# Patient Record
Sex: MALE | Race: WHITE
[De-identification: names, ages, dates, MRNs, and addresses within clinical notes are randomized per-mention and may not be internally consistent; named-entity substitution may affect disease eponyms.]

---

## 2019-11-25 ENCOUNTER — HOSPITAL ENCOUNTER (EMERGENCY)
Dept: HOSPITAL 97 - ER | Age: 69
Discharge: HOME | End: 2019-11-25
Payer: COMMERCIAL

## 2019-11-25 VITALS — OXYGEN SATURATION: 97 % | SYSTOLIC BLOOD PRESSURE: 145 MMHG | DIASTOLIC BLOOD PRESSURE: 90 MMHG

## 2019-11-25 VITALS — TEMPERATURE: 99.2 F

## 2019-11-25 DIAGNOSIS — K80.20: Primary | ICD-10-CM

## 2019-11-25 LAB
ALBUMIN SERPL BCP-MCNC: 3.9 G/DL (ref 3.4–5)
ALP SERPL-CCNC: 66 U/L (ref 45–117)
ALT SERPL W P-5'-P-CCNC: 29 U/L (ref 12–78)
AST SERPL W P-5'-P-CCNC: 16 U/L (ref 15–37)
BUN BLD-MCNC: 12 MG/DL (ref 7–18)
GLUCOSE SERPLBLD-MCNC: 118 MG/DL (ref 74–106)
HCT VFR BLD CALC: 44.7 % (ref 39.6–49)
INR BLD: 0.96
LIPASE SERPL-CCNC: 84 U/L (ref 73–393)
LYMPHOCYTES # SPEC AUTO: 0.8 K/UL (ref 0.7–4.9)
MAGNESIUM SERPL-MCNC: 2.1 MG/DL (ref 1.8–2.4)
NT-PROBNP SERPL-MCNC: 244 PG/ML (ref ?–125)
PMV BLD: 7.9 FL (ref 7.6–11.3)
POTASSIUM SERPL-SCNC: 4.1 MMOL/L (ref 3.5–5.1)
RBC # BLD: 4.88 M/UL (ref 4.33–5.43)
TROPONIN (EMERG DEPT USE ONLY): < 0.02 NG/ML (ref 0–0.04)

## 2019-11-25 PROCEDURE — 96361 HYDRATE IV INFUSION ADD-ON: CPT

## 2019-11-25 PROCEDURE — 96375 TX/PRO/DX INJ NEW DRUG ADDON: CPT

## 2019-11-25 PROCEDURE — 80076 HEPATIC FUNCTION PANEL: CPT

## 2019-11-25 PROCEDURE — 85610 PROTHROMBIN TIME: CPT

## 2019-11-25 PROCEDURE — 36415 COLL VENOUS BLD VENIPUNCTURE: CPT

## 2019-11-25 PROCEDURE — 96365 THER/PROPH/DIAG IV INF INIT: CPT

## 2019-11-25 PROCEDURE — 83690 ASSAY OF LIPASE: CPT

## 2019-11-25 PROCEDURE — 83735 ASSAY OF MAGNESIUM: CPT

## 2019-11-25 PROCEDURE — 71045 X-RAY EXAM CHEST 1 VIEW: CPT

## 2019-11-25 PROCEDURE — 85025 COMPLETE CBC W/AUTO DIFF WBC: CPT

## 2019-11-25 PROCEDURE — 83880 ASSAY OF NATRIURETIC PEPTIDE: CPT

## 2019-11-25 PROCEDURE — 96366 THER/PROPH/DIAG IV INF ADDON: CPT

## 2019-11-25 PROCEDURE — 84484 ASSAY OF TROPONIN QUANT: CPT

## 2019-11-25 PROCEDURE — 76705 ECHO EXAM OF ABDOMEN: CPT

## 2019-11-25 PROCEDURE — 80048 BASIC METABOLIC PNL TOTAL CA: CPT

## 2019-11-25 PROCEDURE — 93005 ELECTROCARDIOGRAM TRACING: CPT

## 2019-11-25 PROCEDURE — 99285 EMERGENCY DEPT VISIT HI MDM: CPT

## 2019-11-25 NOTE — RAD REPORT
EXAM DESCRIPTION:  RAD - Chest Single View - 11/25/2019 5:19 pm

 

CLINICAL HISTORY:  ABDOMINAL DISTENTION

Chest pain.

 

COMPARISON:  No comparisons

 

FINDINGS:  Portable technique limits examination quality.

 

The lungs are grossly clear. The heart is normal in size. No displaced fractures.

 

IMPRESSION:  No acute intrathoracic process suspected.

## 2019-11-25 NOTE — ER
Nurse's Notes                                                                                     

 [facility]                                                                 

Name: Maxwell Barnes                                                                                

Age: 69 yrs                                                                                       

Sex: Male                                                                                         

: 1950                                                                                   

MRN: [id number]                                                                                   

Arrival Date: 2019                                                                          

Time: 16:23                                                                                       

Account#: [id number]                                                                            

Bed 24                                                                                            

Private MD:                                                                                       

Diagnosis: Abdominal tenderness;Cholelithiasis                                                    

                                                                                                  

Presentation:                                                                                     

                                                                                             

16:28 Presenting complaint: Patient states: abd pain since this morning, nausea. Transition   la1 

      of care: patient was not received from another setting of care. Onset of symptoms was       

      2019. Risk Assessment: Do you want to hurt yourself or someone else?           

      Patient reports no desire to harm self or others. Initial Sepsis Screen: Does the           

      patient meet any 2 criteria? HR > 90 bpm. Does the patient have a suspected source of       

      infection? No. Patient's initial sepsis screen is negative. Care prior to arrival: None.    

16:28 Method Of Arrival: Ambulatory                                                           la1 

16:28 Acuity: ARON 3                                                                           la1 

                                                                                                  

Historical:                                                                                       

- Allergies:                                                                                      

16:29 NKDA;                                                                                   la1 

- PMHx:                                                                                           

16:29 None;                                                                                   la1 

                                                                                                  

- Immunization history:: Adult Immunizations up to date.                                          

- Social history:: Smoking status: Patient/guardian denies using tobacco.                         

- Family history:: not pertinent.                                                                 

- Ebola Screening: : No symptoms or risks identified at this time.                                

                                                                                                  

                                                                                                  

Screenin:45 Abuse screen: Denies threats or abuse. Denies injuries from another. Nutritional        aj1 

      screening: No deficits noted. Tuberculosis screening: No symptoms or risk factors           

      identified. Fall Risk None identified.                                                      

                                                                                                  

Assessment:                                                                                       

16:45 General: Appears in no apparent distress. comfortable, Behavior is calm, cooperative,   aj1 

      appropriate for age. Pain: Complains of pain in right upper quadrant and epigastric         

      area Pain does not radiate. Neuro: Level of Consciousness is awake, alert, obeys            

      commands, Oriented to person, place, time, situation. Cardiovascular: Patient's skin is     

      warm and dry. Respiratory: Airway is patent Respiratory effort is even, unlabored,          

      Respiratory pattern is regular, symmetrical. GI: Abdomen is distended, Bowel sounds         

      present X 4 quads. Abdomen is tender to palpation in right upper quadrant and               

      epigastric area Reports upper abdominal pain, bloating, nausea, Patient currently           

      denies diarrhea, vomiting. : No signs and/or symptoms were reported regarding the         

      genitourinary system. EENT: No signs and/or symptoms were reported regarding the EENT       

      system. Derm: Skin is pink, warm \T\ dry. Rash noted that is red, on face.                  

      Musculoskeletal: No signs and/or symptoms reported regarding the musculoskeletal            

      system. Circulation, motion, and sensation intact.                                          

17:36 Reassessment: Patient appears in no apparent distress at this time. No changes from     aj1 

      previously documented assessment. Patient and/or family updated on plan of care and         

      expected duration. Pain level reassessed. Patient is alert, oriented x 3, equal             

      unlabored respirations, skin warm/dry/pink.                                                 

18:30 Reassessment: Patient appears in no apparent distress at this time. No changes from     aj1 

      previously documented assessment. Patient and/or family updated on plan of care and         

      expected duration. Pain level reassessed. Patient is alert, oriented x 3, equal             

      unlabored respirations, skin warm/dry/pink.                                                 

19:30 Reassessment: Patient states that he does not want to be admitted over night. He would  aj1 

      like to go home and follow up with Dr. Trujillo. Notified Dr. Trujillo of patient             

      request, who will go see the patient.                                                       

19:45 Reassessment: Dr. Trujillo at bedside to evaluate patient.                               aj1 

20:03 Reassessment: Patient is okay to be discharged home, patient will report to day surgery aj1 

      at 0900 tomorrow for laproscopic cholecystectomy. House supervisor LESLIE Mitchell, notified       

      and states that he will place patient on surgery schedule. Patient is concerned about       

      getting a ride home after surgery tomorrow. Patient states that he can call himself a       

      cab, Notified house supervisor, LESLIE Mitchell who spoke with OR Director to approve patient      

      calling himself a cab after surgery.                                                        

20:05 Reassessment: Patient to finish Zosyn infusion prior to discharge per Dr. Trujillo.      aj1 

21:03 Reassessment: Patient appears in no apparent distress at this time. No changes from     aj1 

      previously documented assessment. Patient and/or family updated on plan of care and         

      expected duration. Pain level reassessed. Patient is alert, oriented x 3, equal             

      unlabored respirations, skin warm/dry/pink.                                                 

                                                                                                  

Vital Signs:                                                                                      

16:29  / 97; Pulse 115; Resp 16; Temp 99.2; Pulse Ox 100% on R/A; Weight 99.79 kg;      la1 

      Height 5 ft. 11 in. (180.34 cm);                                                            

17:37  / 81; Pulse 93; Resp 18; Pulse Ox 95% on R/A;                                    aj1 

18:30  / 94; Pulse 93; Resp 18; Pulse Ox 98% on R/A;                                    aj1 

19:30  / 87; Pulse 91; Resp 18; Pulse Ox 100% on R/A;                                   aj1 

20:30  / 90; Pulse 90; Resp 18; Pulse Ox 97% on R/A;                                    aj1 

16:29 Body Mass Index 30.68 (99.79 kg, 180.34 cm)                                             la1 

                                                                                                  

ED Course:                                                                                        

16:23 Patient arrived in ED.                                                                  as  

16:29 Triage completed.                                                                       la1 

16:29 Arm band placed on left wrist.                                                          la1 

16:30 Vince Staples MD is Attending Physician.                                             roderick 

16:37 Justina Lemos, RN is Primary Nurse.                                                   aj1 

16:45 No provider procedures requiring assistance completed.                                  aj1 

17:15 Initial lab(s) drawn, by me, sent to lab. Inserted saline lock: 20 gauge in right       jp3 

      wrist, using aseptic technique. Patient maintains SpO2 saturation greater than 95% on       

      room air.                                                                                   

17:16 Bed in low position. Call light in reach. Side rails up X 1. Side rails up X2. Warm     jp3 

      blanket given. Verbal reassurance given. Cardiac monitor on. Pulse ox on. NIBP on.          

17:16 Basic Metabolic Panel Sent.                                                             jp3 

17:25 XRAY Chest (1 view) In Process Unspecified.                                             EDMS

17:35 EKG done, by ED staff, reviewed by Vince Staples MD.                                   jp3 

17:53 US Abdomen Limited In Process Unspecified.                                              EDMS

18:09 Maxwell Trujillo MD is Hospitalizing Provider.                                           roderick 

20:30 Maxwell Trujillo MD is Referral Physician.                                               roderick 

20:30 Referral Physician role handed off by Maxwell Trujillo MD                                roderick 

20:30 Maxwell Trujillo MD is Referral Physician.                                               roderick 

20:59 IV discontinued, intact, bleeding controlled, No redness/swelling at site. Pressure     aj1 

      dressing applied.                                                                           

                                                                                                  

Administered Medications:                                                                         

17:28 Drug: NS 0.9% 1000 ml Route: IV; Rate: 1 bolus; Site: right wrist;                      aj1 

21:02 Follow up: IV Status: Completed infusion; IV Intake: 1000ml                             aj1 

17:29 Drug: Pepcid 20 mg Route: IVP; Site: right wrist;                                       aj1 

18:30 Follow up: Response: No adverse reaction                                                aj1 

17:29 Drug: morphine 2 mg {Note: RASS score 0, patient is alert.} Route: IVP; Site: right     aj1 

      wrist;                                                                                      

18:30 Follow up: Response: No adverse reaction; RASS: Alert and Calm (0)                      aj1 

17:29 Drug: Zofran 4 mg Route: IVP; Site: right wrist;                                        aj1 

18:30 Follow up: Response: No adverse reaction                                                aj1 

18:43 Drug: Zosyn 3.375 grams Route: IVPB; Infused Over: 60 mins; Site: right wrist;          aj1 

21:03 Follow up: IV Status: Completed infusion; IV Intake: 100ml                              aj1 

                                                                                                  

                                                                                                  

Intake:                                                                                           

21:02 IV: 1000ml; Total: 1000ml.                                                              aj1 

21:03 IV: 100ml; Total: 1100ml.                                                               aj1 

                                                                                                  

Outcome:                                                                                          

18:22 Decision to Hospitalize by Provider.                                                    [facility] 

20:31 Discharge ordered by MD.                                                                [facility] 

21:01 Discharged to home ambulatory.                                                          aj1 

21:01 Condition: good                                                                             

21:01 Discharge instructions given to patient, Instructed on discharge instructions, follow       

      up and referral plans. medication usage, Demonstrated understanding of instructions,        

      follow-up care, medications, Prescriptions given X 3.                                       

21:03 Patient left the ED.                                                                    aj1 

                                                                                                  

Signatures:                                                                                       

Dispatcher [employer]                           EDMS                                                 

Justina Lemos RN                     RN   aj1                                                  

Vince Staples MD MD cha Martinez, Amelia as Attema, Lee, RN                         RN   la1                                                  

Doyle Emery jp3                                                  

                                                                                                  

**************************************************************************************************

## 2019-11-25 NOTE — RAD REPORT
EXAM DESCRIPTION:  US - Abdomen Exam Limited - 11/25/2019 5:53 pm

 

CLINICAL HISTORY:  ABD PAIN

 

COMPARISON:  No comparisons

 

FINDINGS:  The gallbladder demonstrates multiple shadowing gallstones No pericholecystic fluid or gal
lbladder wall thickening. The common bile duct is normal measuring 3 mm.

 

The liver demonstrates no findings of intrahepatic biliary dilatation.

 

IMPRESSION:  Cholelithiasis.

## 2019-11-25 NOTE — P.CNS
Date of Consult: 11/25/19





PC:  This 69-year-old male presents emergency room with severe right upper 

quadrant abdominal pain for diagnosis and treatment.





HPC:  Patient been having intermittent pain for the last few weeks.  Pain has 

intensified and became very severe today.  He came in to get it checked out.





PMH:  Enlarged prostate





PSHx:  Previous knee surgery





SOC:  Denies any allergies





SYS REVIEW:  No cough, wheeze, shortness of breath.  No chest pain or 

palpitations.  Denies any change in bowel habit.  Patient is very independent, 

lives by himself, and has a puppy dog at home locked up in a cage..





O/E awake alert vital signs are stable comfortable at the moment








HEENT:  Not jaundiced





Chest:  Clear





ABD:  Soft nontender at the moment





LOCO:  Intact





DATA:  Has documented gallstones, white cell count 11,000





IMPRESSION:  The sedation as coli lithiasis with biliary colic.





PLAN:  I was wanting to admit this patient to the hospital and prepping for 

surgery in the morning.  He has had problem as he has an animal at home that is 

7-month-old and he does not 1 0 leave it alone.  He has requested that he be 

allowed to go to the house take care of his animal and return in the morning 

for outpatient surgery. He is currently stable at the moment, his vital signs 

remain stable.  He would also like to  his home medications and by some 

groceries so he can take care of himself after surgery as he has no one at 

home.  (there are neighbors around).  He has been scheduled for the OR in the 

morning, and he will come in for his surgery. Obviously if anything changes she 

will immediately call 911 and allowed them to bring him in, but he is happy 

with this current plan.

## 2019-11-25 NOTE — EDPHYS
Physician Documentation                                                                           

 [facility]                                                                 

Name: Maxwell Barnes                                                                                

Age: 69 yrs                                                                                       

Sex: Male                                                                                         

: 1950                                                                                   

MRN: [id number]                                                                                   

Arrival Date: 2019                                                                          

Time: 16:23                                                                                       

Account#: [id number]                                                                            

Bed 24                                                                                            

Private MD:                                                                                       

ED Physician Vince Staples                                                                      

HPI:                                                                                              

                                                                                             

16:51 This 69 yrs old  Male presents to ER via Ambulatory with complaints of         roderick 

      Epigastric Pain, Abdominal Pain.                                                            

16:51 The patient presents with abdominal pain in the epigastric area, in the upper abdomen,  roderick 

      abdominal distention in the upper abdomen, in the lower abdomen. Onset: The                 

      symptoms/episode began/occurred 1 day(s) ago. The symptoms do not radiate. Associated       

      signs and symptoms: none. The symptoms are described as constant, crampy. Modifying         

      factors: The symptoms are alleviated by nothing, the symptoms are aggravated by             

      nothing. Severity of pain: At its worst the pain was moderate in the emergency              

      department the pain. The patient has not experienced similar symptoms in the past.          

                                                                                                  

Historical:                                                                                       

- Allergies:                                                                                      

16:29 NKDA;                                                                                   la1 

- PMHx:                                                                                           

16:29 None;                                                                                   la1 

                                                                                                  

- Immunization history:: Adult Immunizations up to date.                                          

- Social history:: Smoking status: Patient/guardian denies using tobacco.                         

- Family history:: not pertinent.                                                                 

- Ebola Screening: : No symptoms or risks identified at this time.                                

                                                                                                  

                                                                                                  

ROS:                                                                                              

16:51 Constitutional: Negative for fever, chills, and weight loss, Eyes: Negative for injury, roderick 

      pain, redness, and discharge, ENT: Negative for injury, pain, and discharge, Neck:          

      Negative for injury, pain, and swelling, Cardiovascular: Negative for chest pain,           

      palpitations, and edema, Respiratory: Negative for shortness of breath, cough,              

      wheezing, and pleuritic chest pain, Back: Negative for injury and pain, : Negative        

      for injury, bleeding, discharge, and swelling, MS/Extremity: Negative for injury and        

      deformity, Skin: Negative for injury, rash, and discoloration, Neuro: Negative for          

      headache, weakness, numbness, tingling, and seizure, Psych: Negative for depression,        

      anxiety, suicide ideation, homicidal ideation, and hallucinations, Allergy/Immunology:      

      Negative for hives, rash, and allergies, Hematologic/Lymphatic: Negative for swollen        

      nodes, abnormal bleeding, and unusual bruising.                                             

16:51 Abdomen/GI: Positive for abdominal pain, of the epigastric area and right upper             

      quadrant.                                                                                   

                                                                                                  

Exam:                                                                                             

16:51 Constitutional:  This is a well developed, well nourished patient who is awake, alert,  roderick 

      and in no acute distress. Head/Face:  Normocephalic, atraumatic. Eyes:  Pupils equal        

      round and reactive to light, extra-ocular motions intact.  Lids and lashes normal.          

      Conjunctiva and sclera are non-icteric and not injected.  Cornea within normal limits.      

      Periorbital areas with no swelling, redness, or edema. ENT:  Nares patent. No nasal         

      discharge, no septal abnormalities noted.  Tympanic membranes are normal and external       

      auditory canals are clear.  Oropharynx with no redness, swelling, or masses, exudates,      

      or evidence of obstruction, uvula midline.  Mucous membranes moist. Neck:  Trachea          

      midline, no thyromegaly or masses palpated, and no cervical lymphadenopathy.  Supple,       

      full range of motion without nuchal rigidity, or vertebral point tenderness.  No            

      Meningismus. Chest/axilla:  Normal chest wall appearance and motion.  Nontender with no     

      deformity.  No lesions are appreciated. Cardiovascular:  Regular rate and rhythm with a     

      normal S1 and S2.  No gallops, murmurs, or rubs.  Normal PMI, no JVD.  No pulse             

      deficits. Respiratory:  Lungs have equal breath sounds bilaterally, clear to                

      auscultation and percussion.  No rales, rhonchi or wheezes noted.  No increased work of     

      breathing, no retractions or nasal flaring. Back:  No spinal tenderness.  No                

      costovertebral tenderness.  Full range of motion. Male :  Normal genitalia with no        

      discharge or lesions. Skin:  Warm, dry with normal turgor.  Normal color with no            

      rashes, no lesions, and no evidence of cellulitis. MS/ Extremity:  Pulses equal, no         

      cyanosis.  Neurovascular intact.  Full, normal range of motion. Neuro:  Awake and           

      alert, GCS 15, oriented to person, place, time, and situation.  Cranial nerves II-XII       

      grossly intact.  Motor strength 5/5 in all extremities.  Sensory grossly intact.            

      Cerebellar exam normal.  Normal gait. Psych:  Awake, alert, with orientation to person,     

      place and time.  Behavior, mood, and affect are within normal limits.                       

16:51 Abdomen/GI: Inspection: distension, Bowel sounds: normal, active, Palpation: mild           

      abdominal tenderness, in the epigastric area and right upper quadrant, Liver: no            

      appreciated palpable abnormalities, Hernia: not appreciated.                                

                                                                                                  

Vital Signs:                                                                                      

16:29  / 97; Pulse 115; Resp 16; Temp 99.2; Pulse Ox 100% on R/A; Weight 99.79 kg;      la1 

      Height 5 ft. 11 in. (180.34 cm);                                                            

17:37  / 81; Pulse 93; Resp 18; Pulse Ox 95% on R/A;                                    aj1 

18:30  / 94; Pulse 93; Resp 18; Pulse Ox 98% on R/A;                                    aj1 

19:30  / 87; Pulse 91; Resp 18; Pulse Ox 100% on R/A;                                   aj1 

20:30  / 90; Pulse 90; Resp 18; Pulse Ox 97% on R/A;                                    aj1 

16:29 Body Mass Index 30.68 (99.79 kg, 180.34 cm)                                             la1 

                                                                                                  

MDM:                                                                                              

16:30 Patient medically screened.                                                             [facility] 

16:56 Data reviewed: vital signs, nurses notes, lab test result(s), EKG, radiologic studies,  [facility] 

      plain films.                                                                                

20:28 ED course: dr licea saw and examined the patient, want patient dc and to follow up in [facility] 

      the morning.                                                                                

                                                                                                  

                                                                                             

16:49 Order name: Basic Metabolic Panel                                                       [facility] 

                                                                                             

16:49 Order name: CBC with Diff; Complete Time: 17:43                                         [facility] 

                                                                                             

16:49 Order name: LFT's; Complete Time: 18:07                                                 [facility] 

                                                                                             

16:49 Order name: Magnesium; Complete Time: 18:07                                             [facility] 

                                                                                             

16:49 Order name: NT PRO-BNP; Complete Time: 18:07                                            [facility] 

                                                                                             

16:49 Order name: PT-INR; Complete Time: 18:07                                                [facility] 

                                                                                             

16:49 Order name: Troponin (emerg Dept Use Only); Complete Time: 18:07                        [facility] 

                                                                                             

16:49 Order name: XRAY Chest (1 view); Complete Time: 18:07                                   [facility] 

                                                                                             

16:49 Order name: Lipase; Complete Time: 18:07                                                [facility] 

                                                                                             

16:49 Order name: US Abdomen Limited; Complete Time: 20:32                                    [facility] 

                                                                                             

16:50 Order name: Basic Metabolic Panel; Complete Time: 18:07                                 [facility]

                                                                                             

16:49 Order name: EKG; Complete Time: 16:51                                                   [facility] 

                                                                                             

16:49 Order name: Cardiac monitoring; Complete Time: 17:13                                    [facility] 

                                                                                             

16:49 Order name: EKG - Nurse/Tech; Complete Time: 17:30                                      [facility] 

                                                                                             

16:49 Order name: IV Saline Lock; Complete Time: 17:13                                        [facility] 

                                                                                             

16:49 Order name: Labs collected and sent; Complete Time: 17:14                               [facility] 

                                                                                             

16:49 Order name: O2 Per Protocol; Complete Time: 17:14                                       [facility] 

                                                                                             

16:49 Order name: O2 Sat Monitoring; Complete Time: 17:14                                     [facility] 

                                                                                             

18:19 Order name: CONS Physician Consult                                                      [facility]

                                                                                                  

Administered Medications:                                                                         

17:28 Drug: NS 0.9% 1000 ml Route: IV; Rate: 1 bolus; Site: right wrist;                      [facility] 

21:02 Follow up: IV Status: Completed infusion; IV Intake: 1000ml                             [facility] 

17:29 Drug: Pepcid 20 mg Route: IVP; Site: right wrist;                                       [facility] 

18:30 Follow up: Response: No adverse reaction                                                [facility] 

17:29 Drug: morphine 2 mg {Note: RASS score 0, patient is alert.} Route: IVP; Site: right     aj 

      wrist;                                                                                      

18:30 Follow up: Response: No adverse reaction; RASS: Alert and Calm (0)                      aj1 

17:29 Drug: Zofran 4 mg Route: IVP; Site: right wrist;                                        aj1 

18:30 Follow up: Response: No adverse reaction                                                [facility] 

18:43 Drug: Zosyn 3.375 grams Route: IVPB; Infused Over: 60 mins; Site: right wrist;          aj1 

21:03 Follow up: IV Status: Completed infusion; IV Intake: 100ml                              aj 

                                                                                                  

                                                                                                  

Disposition:                                                                                      

19 20:31 Discharged to Home. Impression: Abdominal tenderness, Cholelithiasis.              

- Condition is Stable.                                                                            

- Discharge Instructions: Abdominal Pain, Adult, Cholelithiasis, Cholelithiasis,                  

  Easy-to-Read, Abdominal Pain, Adult, Easy-to-Read.                                              

- Prescriptions for Augmentin 875- 125 mg Oral Tablet - take 1 tablet by ORAL route               

  every 12 hours for 7 days; 14 tablet. Bentyl 20 mg Oral Tablet - take 1 tablet by               

  ORAL route every 6 hours As needed; 20 tablet. Pepcid 20 mg Oral Tablet - take 1                

  tablet by ORAL route every 12 hours for 10 days; 20 tablet.                                     

- Medication Reconciliation Form, Thank You Letter, Antibiotic Education, Prescription            

  Opioid Use form.                                                                                

- Follow up: Private Physician; When: Tomorrow; Reason: Recheck today's complaints,               

  Continuance of care, Re-evaluation by your physician. Follow up: Maxwell Licea MD;            

  When: 2 - 3 days; Reason: Recheck today's complaints, Continuance of care,                      

  Re-evaluation by your physician. Follow up: Maxwell Licea MD; When: Tomorrow;                 

  Reason: Recheck today's complaints, Continuance of care, Re-evaluation by your                  

  physician.                                                                                      

- Problem is new.                                                                                 

- Symptoms have improved.                                                                         

                                                                                                  

                                                                                                  

                                                                                                  

Signatures:                                                                                       

Dispatcher [employer]                           EDMS                                                 

Justina Lemos RN                     RN   aj1                                                  

Vince Staples MD MD cha Attema, Lee RN                         RN   la1                                                  

                                                                                                  

Corrections: (The following items were deleted from the chart)                                    

20:27 18:22 Hospitalization Ordered by Maxwell Licea MD for Inpatient Admission. Preliminary  roderick 

      diagnosis is Abdominal tenderness; Cholecystitis; Cholelithiasis. Bed requested for         

      Telemetry/MedSurg (Inpatient). Status is Inpatient Admission. Condition is Stable.          

      Problem is new. Symptoms have improved. UTI on Admission? No. roderick                           

21:03 20:31 2019 20:31 Discharged to Home. Impression: Abdominal tenderness;            aj1 

      Cholelithiasis. Condition is Stable. Forms are Medication Reconciliation Form, Thank        

      You Letter, Antibiotic Education, Prescription Opioid Use. Follow up: Private               

      Physician; When: Tomorrow; Reason: Recheck today's complaints, Continuance of care,         

      Re-evaluation by your physician. Follow up: Maxwell Licea; When: Tomorrow; Reason:          

      Recheck today's complaints, Continuance of care, Re-evaluation by your physician.           

      Problem is new. Symptoms have improved. roderick                                                 

                                                                                                  

**************************************************************************************************

## 2019-11-26 ENCOUNTER — HOSPITAL ENCOUNTER (OUTPATIENT)
Dept: HOSPITAL 97 - OR | Age: 69
Discharge: HOME | End: 2019-11-26
Attending: SURGERY
Payer: COMMERCIAL

## 2019-11-26 VITALS — DIASTOLIC BLOOD PRESSURE: 78 MMHG | SYSTOLIC BLOOD PRESSURE: 136 MMHG

## 2019-11-26 VITALS — OXYGEN SATURATION: 95 %

## 2019-11-26 VITALS — TEMPERATURE: 98.7 F

## 2019-11-26 DIAGNOSIS — K80.12: Primary | ICD-10-CM

## 2019-11-26 DIAGNOSIS — M19.90: ICD-10-CM

## 2019-11-26 PROCEDURE — 88304 TISSUE EXAM BY PATHOLOGIST: CPT

## 2019-11-26 PROCEDURE — 0FT44ZZ RESECTION OF GALLBLADDER, PERCUTANEOUS ENDOSCOPIC APPROACH: ICD-10-PCS

## 2019-11-26 PROCEDURE — 74300 X-RAY BILE DUCTS/PANCREAS: CPT

## 2019-11-26 PROCEDURE — 47562 LAPAROSCOPIC CHOLECYSTECTOMY: CPT

## 2019-11-26 NOTE — RAD REPORT
EXAM DESCRIPTION:  RAD - Cholangiogram Oper-Xray Or - 11/26/2019 2:44 pm

 

CLINICAL HISTORY:  LAP YONG

 

COMPARISON:  Abdomen Exam Limited dated 11/25/2019

 

FINDINGS:  Cystic duct injection was performed by referring physician. No retained stone is evident i
n the CBD.

 

Total fluoro time: 0.2 minutes

Five fluoroscopic images are submitted.

 

IMPRESSION:  No evidence of retained common duct stone.

## 2019-11-26 NOTE — P.OP
Preoperative diagnosis: Cholecystitis with cholelithiasis, biliary colic


Postoperative diagnosis: The same


Primary procedure: Laparoscopic cholecystectomy


Secondary procedure: Cholangiogram


Anesthesia: General


Estimated blood loss: Less than 10 cc


Specimen: Gallbladder and contents


Operative Technique: 





The patient brought the operating room placed supine on the table. After the 

induction of adequate general endotracheal anesthesia, the area of the abdomen 

was prepped with a DuraPrep solution, he was draped in usual aseptic manner.





A subumbilical incision was made. This was brought down through the skin and 

subcutaneous tissue. The Visiport was now used to enter the peritoneal cavity 

and created pneumoperitoneum to approximately 12 mm of mercury.  Under direct 

vision a 5 mm trocar was placed in the upper midline, and 2 other 5 mm trocars 

on the right lateral side of the abdomen.





The patient was now placed in reverse Trendelenburg. He was then rolled to the 

left. We could visualize the right upper quadrant. There was a marked 

inflammatory process just beneath the right lobe of the liver. The omentum was 

adherent along this inferior edge.  Gentle dissection allowed it to be 

dissected free from the liver edge.  It was markedly adherent to the 

gallbladder itself.  The omentum was now dissected off of this using again 

blunt sharp dissection. We were able to clear Jeri's pouch. At this point 

with the gallbladder was markedly distended its contents were aspirated from 

the gallbladder.  A grasper was now placed up on the fundus and a the bone by 

Jeri's pouch. Applying lateral traction we were able to clear and dissected 

the cystic duct and artery.  Having obtained the critical view a clip was 

placed between the gallbladder and the cystic duct. An opening was made into 

the cystic duct through which we obtained a cholangiogram the cholangiogram 

demonstrated good flow contrast into the duodenum. Pressure injection allowed 

us to demonstrate the upper radicals on the right side. The left side did not 

fill.  However there were no filling defects noted. The catheter was now 

removed from the cystic duct. Clips were placed on the distal portion of the 

cystic duct which was fully transected.  Cystic artery was clipped and divided. 

The gallbladder was now dissected free of the liver bed, placed into an Endo-

Catch, and brought out through the umbilical trocar site.





Attention was turned towards the emboli kiss.  The patient did have a previous 

umbilical hernia with a wide emboli kiss.  The fascia was approximated using 3 

interrupted absorbable sutures. The peritoneal cavity was now inspected. The 

irrigating fluid was aspirated from the peritoneal cavity. A ANNETTE block was 

done of the anterior abdominal wall on both left and right sides.  The trocars 

were now removed, the pneumoperitoneum collapse, and the sutures tied. Staples 

were then applied to the skin.





With the procedure he was in a stable condition when sent to the recovery room. 

Needle sponge instrument count were correct. No drains were placed.


Complications: None


Transferred to: Recovery Room


Condition: Good

## 2019-11-26 NOTE — EKG
Test Date:    2019-11-25               Test Time:    17:23:19

Technician:   SOFIA                                    

                                                     

MEASUREMENT RESULTS:                                       

Intervals:                                           

Rate:         96                                     

WA:           176                                    

QRSD:         86                                     

QT:           368                                    

QTc:          464                                    

Axis:                                                

P:            50                                     

WA:           176                                    

QRS:          -12                                    

T:            33                                     

                                                     

INTERPRETIVE STATEMENTS:                                       

                                                     

Sinus rhythm with premature atrial complexes

Otherwise normal ECG

No previous ECG available for comparison



Electronically Signed On 11-26-19 05:30:08 CST by Kiel Beth

## 2024-08-07 ENCOUNTER — HOSPITAL ENCOUNTER (INPATIENT)
Dept: HOSPITAL 97 - ER | Age: 74
LOS: 5 days | Discharge: HOME | DRG: 683 | End: 2024-08-12
Attending: HOSPITALIST | Admitting: HOSPITALIST
Payer: COMMERCIAL

## 2024-08-07 VITALS — BODY MASS INDEX: 29 KG/M2

## 2024-08-07 DIAGNOSIS — E87.0: ICD-10-CM

## 2024-08-07 DIAGNOSIS — N17.9: Primary | ICD-10-CM

## 2024-08-07 DIAGNOSIS — N32.89: ICD-10-CM

## 2024-08-07 DIAGNOSIS — N40.0: ICD-10-CM

## 2024-08-07 DIAGNOSIS — E86.1: ICD-10-CM

## 2024-08-07 DIAGNOSIS — N13.30: ICD-10-CM

## 2024-08-07 DIAGNOSIS — R31.9: ICD-10-CM

## 2024-08-07 DIAGNOSIS — Z86.16: ICD-10-CM

## 2024-08-07 DIAGNOSIS — M13.862: ICD-10-CM

## 2024-08-07 DIAGNOSIS — M13.861: ICD-10-CM

## 2024-08-07 DIAGNOSIS — E78.5: ICD-10-CM

## 2024-08-07 DIAGNOSIS — I10: ICD-10-CM

## 2024-08-07 DIAGNOSIS — Z96.653: ICD-10-CM

## 2024-08-07 DIAGNOSIS — E86.0: ICD-10-CM

## 2024-08-07 DIAGNOSIS — R33.9: ICD-10-CM

## 2024-08-07 LAB
ALBUMIN SERPL BCP-MCNC: 3.5 G/DL (ref 3.4–5)
ALBUMIN/GLOB SERPL: 0.9 {RATIO} (ref 1.1–1.8)
ALP SERPL-CCNC: 78 U/L (ref 45–117)
ALT SERPL W P-5'-P-CCNC: 22 U/L (ref 16–61)
ANION GAP SERPL CALC-SCNC: 10.3 MEQ/L (ref 5–15)
AST SERPL W P-5'-P-CCNC: 15 U/L (ref 15–37)
BUN BLD-MCNC: 43 MG/DL (ref 7–18)
GLOBULIN SER CALC-MCNC: 3.9 G/DL (ref 2.3–3.5)
GLUCOSE SERPLBLD-MCNC: 103 MG/DL (ref 74–106)
HCT VFR BLD CALC: 39.2 % (ref 39.6–49)
HGB BLD-MCNC: 12.6 G/DL (ref 13.6–17.9)
LYMPHOCYTES # SPEC AUTO: 0.8 K/UL (ref 0.7–4.9)
MCH RBC QN AUTO: 29.5 PG (ref 27–35)
MCHC RBC AUTO-ENTMCNC: 32.2 G/DL (ref 32–36)
MCV RBC: 91.7 FL (ref 80–100)
NRBC # BLD: 0 10*3/UL (ref 0–0)
NRBC BLD AUTO-RTO: 0 % (ref 0–0)
PMV BLD: 8.2 FL (ref 7.6–11.3)
POTASSIUM SERPL-SCNC: 4.3 MEQ/L (ref 3.5–5.1)
RBC # BLD: 4.28 M/UL (ref 4.33–5.43)
UA COMPLETE W REFLEX CULTURE PNL UR: (no result)
UA DIPSTICK W REFLEX MICRO PNL UR: (no result)
URATE SERPL-MCNC: 9.4 MG/DL (ref 3.5–7.2)
WBC # BLD AUTO: 8.4 THOU/UL (ref 4.3–10.9)

## 2024-08-07 PROCEDURE — 84100 ASSAY OF PHOSPHORUS: CPT

## 2024-08-07 PROCEDURE — 51702 INSERT TEMP BLADDER CATH: CPT

## 2024-08-07 PROCEDURE — 83970 ASSAY OF PARATHORMONE: CPT

## 2024-08-07 PROCEDURE — 76377 3D RENDER W/INTRP POSTPROCES: CPT

## 2024-08-07 PROCEDURE — 0T9B70Z DRAINAGE OF BLADDER WITH DRAINAGE DEVICE, VIA NATURAL OR ARTIFICIAL OPENING: ICD-10-PCS

## 2024-08-07 PROCEDURE — 81001 URINALYSIS AUTO W/SCOPE: CPT

## 2024-08-07 PROCEDURE — 84439 ASSAY OF FREE THYROXINE: CPT

## 2024-08-07 PROCEDURE — 80048 BASIC METABOLIC PNL TOTAL CA: CPT

## 2024-08-07 PROCEDURE — 85025 COMPLETE CBC W/AUTO DIFF WBC: CPT

## 2024-08-07 PROCEDURE — 36415 COLL VENOUS BLD VENIPUNCTURE: CPT

## 2024-08-07 PROCEDURE — 80069 RENAL FUNCTION PANEL: CPT

## 2024-08-07 PROCEDURE — 74176 CT ABD & PELVIS W/O CONTRAST: CPT

## 2024-08-07 PROCEDURE — 84550 ASSAY OF BLOOD/URIC ACID: CPT

## 2024-08-07 PROCEDURE — 99284 EMERGENCY DEPT VISIT MOD MDM: CPT

## 2024-08-07 PROCEDURE — 84156 ASSAY OF PROTEIN URINE: CPT

## 2024-08-07 PROCEDURE — 82570 ASSAY OF URINE CREATININE: CPT

## 2024-08-07 PROCEDURE — 80053 COMPREHEN METABOLIC PANEL: CPT

## 2024-08-07 PROCEDURE — 83735 ASSAY OF MAGNESIUM: CPT

## 2024-08-07 PROCEDURE — 82550 ASSAY OF CK (CPK): CPT

## 2024-08-07 PROCEDURE — 82947 ASSAY GLUCOSE BLOOD QUANT: CPT

## 2024-08-07 PROCEDURE — 84443 ASSAY THYROID STIM HORMONE: CPT

## 2024-08-07 RX ADMIN — HEPARIN SODIUM SCH: 5000 INJECTION, SOLUTION INTRAVENOUS; SUBCUTANEOUS at 20:15

## 2024-08-07 NOTE — EDPHYS
Physician Documentation                                                                           

 [facility]                                                                 

Name: Maxwell Barnes                                                                                

Age: 74 yrs                                                                                       

Sex: Male                                                                                         

: 1950                                                                                   

MRN: [id number]                                                                                   

Arrival Date: 2024                                                                          

Time: 12:11                                                                                       

Account#: [id number]                                                                            

Bed 11                                                                                            

Private MD:                                                                                       

ED Physician Gama Tinoco                                                                         

HPI:                                                                                              

                                                                                             

13:38 This 74 yrs old Male presents to ER via Ambulatory with complaints of Abnormal Lab      rn  

      Results.                                                                                    

13:38 Patient reports sent in by nurse practitioner Makayla for abnormal renal function        rn  

      studies that were obtained on Monday. Patient reports COVID a month ago and noticed         

      darker urine and decreased urination shortly after, now clearing and becoming normal        

      again. Reports generalized fatigue and malaise. No fever or chills. No new medications      

      or supplements.. The patient has not experienced similar symptoms in the past. The          

      patient has been recently seen by a physician:.                                             

                                                                                                  

Historical:                                                                                       

- Allergies:                                                                                      

12:29 NKDA;                                                                                   cm10

- PMHx:                                                                                           

12:29 None;                                                                                   cm10

                                                                                                  

- Immunization history:: Adult Immunizations up to date.                                          

- Infectious Disease History:: Denies.                                                            

- Social history:: Smoking status: Patient denies any tobacco usage or history of.                

- Family history:: not pertinent.                                                                 

- Hospitalizations: : No recent hospitalization is reported.                                      

                                                                                                  

                                                                                                  

ROS:                                                                                              

13:38 Constitutional: Negative for fever, chills, and weight loss, Cardiovascular: Negative   rn  

      for chest pain, palpitations, and edema, Respiratory: Negative for shortness of breath,     

      cough, wheezing, and pleuritic chest pain, Abdomen/GI: Negative for abdominal pain,         

      nausea, vomiting, diarrhea, and constipation, Back: Negative for injury and pain, :       

      Negative for injury, bleeding, discharge, and swelling, MS/Extremity: Negative for          

      injury and deformity, Skin: Negative for injury, rash, and discoloration, Neuro:            

      Negative for headache, weakness, numbness, tingling, and seizure,                           

                                                                                                  

Exam:                                                                                             

13:38 Constitutional:  This is a well developed, well nourished patient who is awake, alert,  rn  

      and in no acute distress. Head/Face:  Normocephalic, atraumatic. Cardiovascular:            

      Tachycardic, regular.  No pulse deficits. Respiratory:  No increased work of breathing,     

      no retractions or nasal flaring. Abdomen/GI:  Soft, non-tender MS/ Extremity:  Pulses       

      equal, no cyanosis.  Neuro:  Awake and alert, GCS 15                                        

                                                                                                  

Vital Signs:                                                                                      

12:27  / 100; Pulse 102; Resp 15; Temp 98.3; Pulse Ox 96% on R/A; Weight 100.24 kg;     cm10

      Height 5 ft. 11 in. ; Pain 0/10;                                                            

20:02  / 89; Pulse 92; Resp 18; Pulse Ox 99% ;                                          kj2 

12:27 Body Mass Index 30.82 (100.24 kg, 180.34 cm)                                            cm10

12:27 Pain Scale: Adult                                                                       cm10

                                                                                                  

MDM:                                                                                              

12:21 Patient medically screened.                                                             rn  

13:42 Differential Diagnosis acute kidney injury, dehydration. Data reviewed: vital signs,    rn  

      nurses notes, lab test result(s), and as a result, I will discharge patient.                

      Counseling: I had a detailed discussion with the patient and/or guardian regarding the      

      historical points, exam findings, and any diagnostic results supporting the                 

      discharge/admit diagnosis, lab results, the need for further work-up and treatment in       

      the hospital.                                                                               

16:46 ED course: CT shows bilateral hydronephrosis likely secondary to prostatomegaly.        rn  

      Patient has known prostate problems and takes tamsulosin. Patient reports has been          

      urinating fine recently, able to urinate on command, and is actually been wearing           

      depends lately. Will place Rosas catheter for decompression and admit to hospital..         

                                                                                                  

                                                                                             

12:39 Order name: CBC with Diff; Complete Time: 13:32                                         cm10

                                                                                             

12:39 Order name: CMP; Complete Time: 13:32                                                   cm10

                                                                                             

12:39 Order name: Urinalysis w/ reflexes; Complete Time: 13:32                                cm10

                                                                                             

17:48 Order name: Creatine Phosphokinase                                                      [facility]

                                                                                             

17:48 Order name: PTH Intact                                                                  [facility]

                                                                                             

17:48 Order name: Uric Acid                                                                   [facility]

                                                                                             

17:48 Order name: Basic Metabolic Panel                                                       [facility]

                                                                                             

17:48 Order name: Basic Metabolic Panel                                                       [facility]

                                                                                             

17:48 Order name: Basic Metabolic Panel                                                       [facility]

                                                                                             

17:48 Order name: Basic Metabolic Panel                                                       [facility]

                                                                                             

17:48 Order name: Basic Metabolic Panel                                                       [facility]

                                                                                             

17:48 Order name: Basic Metabolic Panel                                                       [facility]

                                                                                             

17:48 Order name: Basic Metabolic Panel                                                       [facility]

                                                                                             

17:48 Order name: Basic Metabolic Panel                                                       [facility]

                                                                                             

17:48 Order name: CBC with Automated Diff                                                     [facility]

                                                                                             

17:48 Order name: CBC with Automated Diff                                                     [facility]

                                                                                             

17:48 Order name: CBC with Automated Diff                                                     [facility]

                                                                                             

17:49 Order name: CBC with Automated Diff                                                     [facility]

                                                                                             

17:49 Order name: CBC with Automated Diff                                                     [facility]

                                                                                             

17:49 Order name: CBC with Automated Diff                                                     [facility]

                                                                                             

17:49 Order name: CBC with Automated Diff                                                     [facility]

                                                                                             

17:49 Order name: CBC with Automated Diff                                                     [facility]MS

                                                                                             

17:49 Order name: Magnesium                                                                   [facility]MS

                                                                                             

17:49 Order name: Magnesium                                                                   EDMS

                                                                                             

17:49 Order name: Magnesium                                                                   EDMS

                                                                                             

17:49 Order name: Magnesium                                                                   EDMS

                                                                                             

17:49 Order name: Magnesium                                                                   EDMS

                                                                                             

17:49 Order name: Magnesium                                                                   EDMS

                                                                                             

17:49 Order name: Magnesium                                                                   EDMS

                                                                                             

17:49 Order name: Magnesium                                                                   EDMS

                                                                                             

17:49 Order name: Phosphorus                                                                  EDMS

                                                                                             

17:49 Order name: Phosphorus                                                                  EDMS

                                                                                             

17:49 Order name: Phosphorus                                                                  EDMS

                                                                                             

17:49 Order name: Phosphorus                                                                  EDMS

                                                                                             

17:49 Order name: Phosphorus                                                                  EDMS

                                                                                             

17:49 Order name: Phosphorus                                                                  EDMS

                                                                                             

17:49 Order name: Phosphorus                                                                  EDMS

                                                                                             

17:49 Order name: Phosphorus                                                                  EDMS

                                                                                             

15:47 Order name: CT Stone Protocol; Complete Time: 16:37                                     rn  

                                                                                             

17:48 Order name: CONS Physician Consult                                                      EDMS

                                                                                             

16:46 Order name: Alison; Complete Time: 17:14                                                 rn  

                                                                                                  

Administered Medications:                                                                         

No medications were administered                                                                  

                                                                                                  

                                                                                                  

Disposition Summary:                                                                              

24 13:44                                                                                    

Hospitalization Ordered                                                                           

 Notes:       Hospitalization Status: Inpatient Admission                                           
  rn

      Provider: Shant Bolanos                                                                rn  

      Location: [facility] (Inpatient)                                                 rn  

      Condition: Stable                                                                       rn  

      Problem: new                                                                            rn  

      Symptoms: are unchanged                                                                 rn  

      Bed/Room Type: Standard                                                                 rn  

      Room Assignment: 213(24 21:09)                                                    rv1 

      Diagnosis                                                                                   

        - Acute kidney failure, unspecified                                                   rn  

      Forms:                                                                                      

        - Medication Reconciliation Form                                                      rn  

        - SBAR form                                                                           rn  

        - Leadership Thank You Letter                                                         rn  

Signatures:                                                                                       

Dispatcher [facility]                           Gama Chaney MD MD rn Villegas Nadia                            rv1                                                  

May Rob RN                  RN   cm10                                                 

                                                                                                  

Corrections: (The following items were deleted from the chart)                                    

 13:44 rn                                                                                rv1 

                                                                                                  

**************************************************************************************************

## 2024-08-07 NOTE — RAD REPORT
EXAM DESCRIPTION:  CTStone Protocol - 8/7/2024 4:06 pm

 

CLINICAL HISTORY:

eval for obstructive uropathy

 

COMPARISON:  CT ABD PELVIS W CONTRAST dated 8/23/2012

 

TECHNIQUE:  CT of the abdomen and pelvis was performed without contrast.

 

All CT scans are performed using dose optimization technique as appropriate and may include automated
 exposure control or mA/KV adjustment according to patient size.

 

FINDINGS:  Lower chest: Small pericardial effusion. Coronary calcifications.

Liver: No acute abnormality or suspicious lesions.

Biliary: No biliary ductal dilatation. Cholecystectomy .

Stomach: No significant focal abnormality.

Duodenum: No significant focal abnormality.

Pancreas: No significant abnormality.

Spleen: No significant abnormality.

Adrenal: No suspicious lesions.

Kidney/ureter: Bilateral hydronephrosis . No renal calculi.

Retroperitoneum: No retroperitoneal adenopathy.

Vascular: No aneurysm. Atherosclerosis .

Bowel: No significant focal abnormality.

Peritoneum: No ascites or free air. Fat containing left inguinal hernia.

Bladder: Distended bladder.

Reproductive: No adnexal masses.

Bones: No acute fracture. Multilevel degenerative changes are present in the spine.

Other: n/a

 

IMPRESSION:  Bilateral hydronephrosis and distended bladder with markedly enlarged prostate concernin
g for acute urinary retention. Suggest Rosas catheter decompression.

## 2024-08-07 NOTE — P.HP
Certification for Inpatient


Patient admitted to: Inpatient


With expected LOS: >2 Midnights


Patient will require the following post-hospital care: None


Practitioner: I am a practitioner with admitting privileges, knowledge of 

patient current condition, hospital course, and medical plan of care.


Services: Services provided to patient in accordance with Admission requirements

found in Title 42 Section 412.3 of the Code of Federal Regulations





Patient History


Date of Service: 08/07/24


Reason for admission: JOVITA


History of Present Illness: 





Maxwell Barnes is a 74 year old male with Pmhx left Femoral bypass, bilateral knee

surgeries, who presented to the ED from his doctor's office where labs were 

drawn showing acute kidney injury. He reports using ibuprofen for his chronic 

knee pain. Maxwell does not have a history of kidney problems but did have COVID 

one month ago. He reports wearing a pull up for incontinence. 





Rosas catheter placed in the ED, UOP 1600 on the floor. 





Laboratory evaluation BUN/Creatinine 43/5.17, GFR 11, UA negative for infectious

process.





CT abdomen reports "bilateral hydronephrosis and distended bladder with markedly

enlarged prostate concerning for acute urinary retention. "





Maxwell will be admitted to hospitalist service for further treatment, Dr. Alaniz consulted. 


Allergies





No Known Drug Allerg Allergy (Uncoded 11/26/19 10:28)


   Unknown





Home Medications: 








Fluticasone [Flonase 50mcg Nasal Spray] 120 sprays NS DAILY 11/26/19 


Ibuprofen [Advil] 200 mg PO BEDTIME 11/26/19 








- Past Medical/Surgical History


Past Medical History: Patient denies medical history


-: left femoral bypass


-: bilateral knee surgeries





- Social History


Smoking Status: Never smoker


Alcohol use: No


CD- Drugs: No





Review of Systems


Unremarkable





Physical Examination





- Physical Exam


General: Alert, In no apparent distress, Oriented x3


HEENT: Atraumatic, Normocephalic, PERRLA


Neck: Supple, 2+ carotid pulse no bruit


Respiratory: Clear to auscultation bilaterally, Normal air movement


Cardiovascular: Normal pulses, Regular rate/rhythm, Normal S1 S2


Capillary refill: <2 Seconds


Gastrointestinal: Normal bowel sounds, Soft and benign, No tenderness, Distended

 (obese)


Musculoskeletal: No clubbing


Integumentary: No rashes


Neurological: Normal speech, Normal tone





- Studies


Laboratory Data (last 24 hrs)











  08/07/24 08/07/24





  12:42 12:42


 


WBC   8.40


 


Hgb   12.6 L


 


Hct   39.2 L


 


Plt Count   271


 


Sodium  143 


 


Potassium  4.3 


 


BUN  43 H 


 


Creatinine  5.17 H 


 


Glucose  103 


 


Total Bilirubin  0.7 


 


AST  15 


 


ALT  22 


 


Alkaline Phosphatase  78 











Assessment and Plan





- Plan


Assessment and plan








Bilateral hydronephrosis associated with distended bladder secondary to BPH


JOVITA secondary to obstruction from BPH


- Rosas placed in ED


-Creatinine 5.17, monitor in the AM


-Dr. Alaniz consulted


-CT abdomen with contrast


-Ultrasound kidney ordered


-PTH, uric acid, CK


-UA negative for infectious process


-Hold NSAIDs at this time


-Will need follow-up outpatient with Dr. Diop











Chronic bilateral knee pain s/p surgery


Arthritis


-Reports taking ibuprophen daily











DVT ppx heparin


Full code


LOS 3 days











Discharge Plan: Home


Plan to discharge in: 48 Hours





- Advance Directives


Does patient have a Living Will: No


Does patient have a Durable POA for Healthcare: No

## 2024-08-07 NOTE — ER
Nurse's Notes                                                                                     

 [facility]                                                                 

Name: Maxwell Barnes                                                                                

Age: 74 yrs                                                                                       

Sex: Male                                                                                         

: 1950                                                                                   

MRN: [id number]                                                                                   

Arrival Date: 2024                                                                          

Time: 12:11                                                                                       

Account#: [id number]                                                                            

Bed 11                                                                                            

Private MD:                                                                                       

Diagnosis: Acute kidney failure, unspecified                                                      

                                                                                                  

Presentation:                                                                                     

                                                                                             

12:27 Chief complaint: Patient states: Sent to the ER by PCP for abnormal Kidney function     cm10

      labs. Pt states that he has never been told that he issues with kidneys. Coronavirus        

      screen: Client denies travel out of the [place] in the last 14 days. At this time, the         

      client does not indicate any symptoms associated with coronavirus-19. Ebola Screen:         

      Patient denies travel to an Ebola-affected area in the 21 days before illness onset. No     

      symptoms or risks identified at this time. Initial Sepsis Screen: Does the patient meet     

      any 2 criteria? No. Patient's initial sepsis screen is negative. Does the patient have      

      a suspected source of infection? No. Patient's initial sepsis screen is negative. Risk      

      Assessment: Do you want to hurt yourself or someone else? Patient reports no desire to      

      harm self or others. Onset of symptoms was 2024.                                 

12:27 Method Of Arrival: Ambulatory                                                           cm10

12:27 Acuity: ARON 3                                                                           cm10

                                                                                                  

Triage Assessment:                                                                                

12:29 General: Appears in no apparent distress. comfortable, Behavior is calm, cooperative.   cm10

      Pain: Denies pain. Neuro: No deficits noted. Level of Consciousness is awake, alert,        

      obeys commands, Oriented to person, place, time, situation, Appropriate for age.            

      Respiratory: No deficits noted. Airway is patent Respiratory effort is even, unlabored,     

      Respiratory pattern is regular, symmetrical.                                                

                                                                                                  

Historical:                                                                                       

- Allergies:                                                                                      

12:29 NKDA;                                                                                   cm10

- PMHx:                                                                                           

12:29 None;                                                                                   cm10

                                                                                                  

- Immunization history:: Adult Immunizations up to date.                                          

- Infectious Disease History:: Denies.                                                            

- Social history:: Smoking status: Patient denies any tobacco usage or history of.                

- Family history:: not pertinent.                                                                 

- Hospitalizations: : No recent hospitalization is reported.                                      

                                                                                                  

                                                                                                  

Assessment:                                                                                       

19:59 General: Appears in no apparent distress. uncomfortable, Behavior is calm, cooperative. kj2 

      Pain: Complains of pain in prostate Pain currently is 5 out of 10 on a pain scale.          

      Neuro: Level of Consciousness is awake, alert, obeys commands. Cardiovascular:              

      Patient's skin is warm and dry. Respiratory: Airway is patent Respiratory effort is         

      unlabored. : Rosas in place.                                                              

                                                                                                  

Vital Signs:                                                                                      

12:27  / 100; Pulse 102; Resp 15; Temp 98.3; Pulse Ox 96% on R/A; Weight 100.24 kg;     cm10

      Height 5 ft. 11 in. ; Pain 0/10;                                                            

20:02  / 89; Pulse 92; Resp 18; Pulse Ox 99% ;                                          kj2 

12:27 Body Mass Index 30.82 (100.24 kg, 180.34 cm)                                            cm10

12:27 Pain Scale: Adult                                                                       cm10

                                                                                                  

ED Course:                                                                                        

12:20 Patient arrived in ED.                                                                  mg5 

12:20 Gama Tinoco MD is Attending Physician.                                                rn  

12:29 Triage completed.                                                                       cm10

12:30 Arm band placed on Patient placed in waiting room.                                      cm10

12:39 Initial lab(s) drawn, by me, sent to lab. Inserted saline lock: 22 gauge in right       cm10

      antecubital area, using aseptic technique. Blood collected. Flushed with 10 mL NS.          

13:44 Shant Bolanos is Hospitalizing Provider.                                               rn  

16:08 CT Stone Protocol In Process Unspecified.                                               EDMS

17:14 Rosas cath inserted, using sterile technique, 16 Fr., by me, balloon inflated, to       em1 

      gravity drainage, returned clear yellow urine. Patient tolerated well.                      

19:58 Cecilia Willard, RN is Primary Nurse.                                                   kj2 

                                                                                                  

Administered Medications:                                                                         

No medications were administered                                                                  

                                                                                                  

                                                                                                  

Outcome:                                                                                          

13:44 Decision to Hospitalize by Provider.                                                    rn  

22:10 Patient left the ED.                                                                    jb4 

                                                                                                  

Signatures:                                                                                       

Dispatcher [employer]                           EDMS                                                 

Gama Tinoco MD MD rn Martinez, Eric                               em1                                                  

Maxwell Randle RN                       RN   jb4                                                  

May Rob RN                  RN   cm10                                                 

Marina Whitaker                             mg5                                                  

Cecilia Willard, LESLIE                     RN   kj2                                                  

                                                                                                  

Corrections: (The following items were deleted from the chart)                                    

20:02 19:59 General: kj2                                                                      kj2 

                                                                                                  

**************************************************************************************************

## 2024-08-08 LAB
ANION GAP SERPL CALC-SCNC: 13.5 MEQ/L (ref 5–15)
BLD SMEAR INTERP: (no result)
BUN BLD-MCNC: 48 MG/DL (ref 7–18)
CREAT UR-SCNC: 35 MG/DL (ref 20–370)
GLUCOSE SERPLBLD-MCNC: 122 MG/DL (ref 74–106)
HCT VFR BLD CALC: 43.6 % (ref 39.6–49)
HGB BLD-MCNC: 14.3 G/DL (ref 13.6–17.9)
LYMPHOCYTES # SPEC AUTO: 0.8 K/UL (ref 0.7–4.9)
MAGNESIUM SERPL-MCNC: 2.4 MG/DL (ref 1.6–2.4)
MCH RBC QN AUTO: 30 PG (ref 27–35)
MCHC RBC AUTO-ENTMCNC: 32.7 G/DL (ref 32–36)
MCV RBC: 91.7 FL (ref 80–100)
MORPHOLOGY BLD-IMP: (no result)
NRBC # BLD: 0 10*3/UL (ref 0–0)
NRBC BLD AUTO-RTO: 0 % (ref 0–0)
PMV BLD: 8.6 FL (ref 7.6–11.3)
POTASSIUM SERPL-SCNC: 4.5 MEQ/L (ref 3.5–5.1)
PROT UR-MCNC: 434.8 MG/DL (ref ?–11.9)
RBC # BLD: 4.75 M/UL (ref 4.33–5.43)
WBC # BLD AUTO: 10.8 THOU/UL (ref 4.3–10.9)

## 2024-08-08 RX ADMIN — TAMSULOSIN HYDROCHLORIDE SCH MG: 0.4 CAPSULE ORAL at 08:18

## 2024-08-08 RX ADMIN — TAMSULOSIN HYDROCHLORIDE SCH MG: 0.4 CAPSULE ORAL at 21:50

## 2024-08-08 RX ADMIN — HYDRALAZINE HYDROCHLORIDE PRN MG: 20 INJECTION INTRAMUSCULAR; INTRAVENOUS at 08:19

## 2024-08-08 RX ADMIN — SODIUM CHLORIDE SCH MLS: 0.9 INJECTION, SOLUTION INTRAVENOUS at 11:46

## 2024-08-08 RX ADMIN — HYDROMORPHONE HYDROCHLORIDE SCH MG: 2 TABLET ORAL at 18:20

## 2024-08-08 RX ADMIN — MORPHINE SULFATE PRN MG: 2 INJECTION, SOLUTION INTRAMUSCULAR; INTRAVENOUS at 10:04

## 2024-08-08 NOTE — CON
Date of Consultation:  08/08/2024



Reason For Consultation:  Elevated BUN and creatinine, fluid management.



History Of Present Illness:  This is a pleasant 74-year-old gentleman with significant past medical h
istory of osteoarthritis, hyperlipidemia, benign prostate hypertrophy, history of left femoral bypass
 secondary to laceration on the muscles.  Patient had osteoarthritis with the right knee replacement 
back in 2009.  Patient being on Advil PM for more than 15 years.  According to the patient, patient w
ent for his regular checkup done lab last week, found to have elevation in BUN and creatinine without
 any symptoms.  For that reason, lab was repeated, confirmed the elevation.  For that reason, patient
 referred to the hospital.  As I mentioned, patient being taking Advil in a daily basis.  Patient den
ied any exposure to contrast.  The workup over the night show bilateral hydronephrosis.  According to
 the patient, his outpatient lab show PSA of 5.7.  As by the patient, he has regular yearly lab witho
ut mentioning about any kidney disease ever before.  The patient had nocturia.  The patient had previ
ous admission to the hospital a few years back according to him.  At that time, he has Rosas inserted
 for 2-1/2 months.  Yesterday, apparently Rosas was inserted, developed hematuria.  For that reason, 
Rosas was removed.  Patient continued to have hematuria.



Past Medical History:  Include:

1.Hyperlipidemia.

2.Benign prostate hypertrophy.

3.Osteoarthritis.



Home Medications:  Include Flonase and ibuprofen.



Allergies:  NO KNOWN DRUGS ALLERGY.



Past Surgical History:  

1.Left femoral bypass.

2.Bilateral knee replacement, 2009.



Social History:  Denied smoking.  Denied alcohol.  Denied drugs abuse.



Review of Systems:

Head and Neck:  No red eye.  No ear pain. 

GI:  No nausea.  No vomiting. 

:  No polyuria.  Has nocturia. 

GYN:  Not applicable. 

Respiratory:  No shortness of breath. 

Cardiovascular:  No chest pain. 

Endocrine:  No polydipsia. 

Skin:  No rash. 

Neuro:  Has low back pain.  Has knee pain. 

Musculoskeletal:  Low back pain and knee pain.



Physical Examination:

General:  When I saw the patient, patient lying in bed, comfortable, not on any distress. 

Vital Signs:  Blood pressure 185/93, pulse of 94, afebrile. 

Chest:  Clear to auscultation. 

Heart:  S1, S2.  Regular. 

Abdomen:  Soft, nontender.  Dullness on the suprapubic area.  Again, patient obese, completely apprec
iated. 

Extremities:  No edema. 

Neurologic:  Alert.  No focality.  No tremor.



Laboratory Data:  As of yesterday; WBC 8.4, hemoglobin is 12.6, sodium 143, potassium 4.3, bicarb 21,
 BUN 43, creatinine 5.1, GFR of 11, calcium 9.3.  Urinalysis:  Specific gravity 1.007, negative for i
nfection.  Reviewing the record for the patient, patient used to follow up with Urology, Dr. Rivera. 
 Labs back in 2019 with normal kidney function, creatinine 0.9.  Today's lab data:  Sodium 146, potas
sium 4.6, bicarb 23, BUN 48, creatinine 5.1, GFR of 11, calcium 9.3, .  WBC 10.8, hemoglobin 1
4.3.



Current Medications:  The patient on, it includes:

1.Flomax.

2.Tylenol.

3.Morphine.



Assessment And Plan:  

1.Acute kidney injury secondary to obstructive uropathy, superimposed with nonsteroidal use.  No ure
jackie symptoms.  No hyperkalemia.  No acidosis.  It looked to me there is component of chronic STEVENSON seco
ndary to nonsteroid supported with the marginal elevation in PTH.

a.I had long discussion with the patient for the need to reinsert the Rosas and monitor the patient.


b.I am going to go ahead and increase the Flomax to b.i.d. and place the patient on finasteride.  Stewart cordoba will need urology evaluation.

c.I agree with holding the nonsteroid.

d.I am going to start the patient on hydration.

e.I am going to go ahead and send for PC ratio and we will follow up the patient.

f.I do not see the need to initiate any renal replacement therapy for the time being.  We will follo
w up recovery.

2.Obstructive uropathy with acute kidney injury.  As above.

3.Dehydration with marginal hypernatremia.  We will start hydration.

4.Benign prostate hypertrophy.  Patient is going to need urology evaluation. 



Thank you, Dr. Bolanos, for allowing us to participate in the care of your patient.





MA/VIC

DD:  08/08/2024 10:21:26Voice ID:  [id number]

DT:  08/08/2024 13:38:02Report ID:  [id number]

## 2024-08-08 NOTE — P.PN
Subjective


Date of Service: 08/08/24


Chief Complaint: JOVITA


Patient had an episode of hematuria with complaining penile pain last night.


Rosas catheter was removed.


Patient states he has been peeing without a Rosas catheter.














Physical Examination





- Vital Signs


Temperature: 98.6 F


Blood Pressure: 183/91


Pulse: 96


Respirations: 16


Pulse Ox (%): 97





- Studies


Laboratory Data (last 24 hrs)











  08/07/24





  12:42


 


Uric Acid  9.4 H











Assessment And Plan





- Plan


Physical examination


General: Alert and oriented x3, NAD, 


HEENT: Conjunctiva not pale, anicteric sclera


Neck: Supple, no elevated JVD


Heart: Heart sounds 1 and 2 normal, regular rhythm, normal rate, no pedal edema


Lungs: Clear to auscultation bilaterally, adequate breath sounds bilaterally, no

rhonchi or crackles.


Abdomen: Soft, nondistended, nontender, normal bowel sounds.


Extremities: No tenderness, no deformity


Skin: Normal skin turgor, no rash, no nodules or ulcers.


Neuro: No focal motor deficit.  Normal speech.


Psychiatry: Normal mood, no agitation.








Bilateral hydronephrosis associated with distended bladder secondary to BPH


JOVITA secondary to obstruction from BPH


-Reinsert Rosas catheter


-UA negative for infectious process


-Bladder irrigation as needed for hematuria


-Patient seen by nephrology Dr. Alaniz.


-Monitor renal function.


-Hold NSAIDs at this time


-Will need follow-up outpatient with Dr. Diop











Chronic bilateral knee pain s/p surgery


Arthritis


-Reports taking ibuprophen daily


-NSAID discontinued.








DVT prophylaxis: SCD, no anticoagulation given hematuria.


Advance directive: Full code.

## 2024-08-09 LAB
ALBUMIN SERPL BCP-MCNC: 2.9 G/DL (ref 3.4–5)
ANION GAP SERPL CALC-SCNC: 11.8 MEQ/L (ref 5–15)
BUN BLD-MCNC: 46 MG/DL (ref 7–18)
GLUCOSE SERPLBLD-MCNC: 115 MG/DL (ref 74–106)
HCT VFR BLD CALC: 38.8 % (ref 39.6–49)
HGB BLD-MCNC: 12.4 G/DL (ref 13.6–17.9)
LYMPHOCYTES # SPEC AUTO: 1.2 K/UL (ref 0.7–4.9)
MAGNESIUM SERPL-MCNC: 1.8 MG/DL (ref 1.6–2.4)
MCH RBC QN AUTO: 29.2 PG (ref 27–35)
MCHC RBC AUTO-ENTMCNC: 31.9 G/DL (ref 32–36)
MCV RBC: 91.7 FL (ref 80–100)
NRBC # BLD: 0 10*3/UL (ref 0–0)
NRBC BLD AUTO-RTO: 0 % (ref 0–0)
PMV BLD: 8.7 FL (ref 7.6–11.3)
POTASSIUM SERPL-SCNC: 3.8 MEQ/L (ref 3.5–5.1)
RBC # BLD: 4.23 M/UL (ref 4.33–5.43)
TSH SERPL DL<=0.05 MIU/L-ACNC: 3.82 UIU/ML (ref 0.36–3.74)
URATE SERPL-MCNC: 8.7 MG/DL (ref 3.5–7.2)
WBC # BLD AUTO: 11.4 THOU/UL (ref 4.3–10.9)

## 2024-08-09 RX ADMIN — DOCUSATE SODIUM SCH MG: 100 CAPSULE, LIQUID FILLED ORAL at 21:49

## 2024-08-09 RX ADMIN — MAGNESIUM SULFATE IN DEXTROSE ONE MLS: 10 INJECTION, SOLUTION INTRAVENOUS at 08:40

## 2024-08-09 RX ADMIN — POTASSIUM CHLORIDE ONE MEQ: 10 TABLET, FILM COATED, EXTENDED RELEASE ORAL at 08:39

## 2024-08-09 RX ADMIN — FINASTERIDE SCH MG: 5 TABLET, FILM COATED ORAL at 08:40

## 2024-08-09 NOTE — P.PN
Subjective


Date of Service: 08/09/24


Chief Complaint: JOVITA


Patient states her bladder pain is much better.


He reports good sleep last night.


Rosas catheter in place draining bloodstained urine.  It appears the urine is 

clearing compared to yesterday.














Physical Examination





- Vital Signs


Temperature: 97.7 F


Blood Pressure: 145/89


Pulse: 69


Respirations: 18


Pulse Ox (%): 98





Assessment And Plan





- Plan


Physical examination


General: Alert and oriented x3, NAD, 


HEENT: Conjunctiva not pale, anicteric sclera


Neck: Supple, no elevated JVD


Heart: Heart sounds 1 and 2 normal, regular rhythm, normal rate, no pedal edema


Lungs: Clear to auscultation bilaterally, adequate breath sounds bilaterally, no

rhonchi or crackles.


Abdomen: Soft, nondistended, nontender, normal bowel sounds.


Genitourinary: Rosas catheter in place


Extremities: No tenderness, no deformity


Skin: Normal skin turgor, no rash, no nodules or ulcers.


Neuro: No focal motor deficit.  Normal speech.


Psychiatry: Normal mood, no agitation.





Plan:


Bilateral hydronephrosis associated with distended bladder secondary to BPH


JOVITA secondary to obstruction from BPH


Hematuria


-Rosas catheter reinserted and draining bloodstained fluid.


-UA negative for infectious process


-Serum creatinine is trending down.


-Continue IV hydration


-Oral rehydration as tolerated.


-Bladder irrigation as needed for hematuria


-Nephrology is following


-Monitor renal function.


-Hold NSAIDs at this time


-Will need follow-up outpatient with Dr. Diop











Chronic bilateral knee pain s/p surgery


Arthritis


-Reports taking ibuprophen daily


-NSAID discontinued.








DVT prophylaxis: SCD, no anticoagulation given hematuria.


Advance directive: Full code.

## 2024-08-09 NOTE — PN
Date of Progress Note:  08/09/2024



Subjective:  The patient had JOVITA.  Upon admission, creatinine was 5.7.  Rosas catheter was placed.  S
tarted on IV fluids.  Creatinine improved to 4.3.  The patient had a traumatic hematuria.  Continue I
V fluid.



Physical Examination:

Vital Signs:  Temperature 98.3, pulse rate 67, blood pressure 140/83. 

General:  Awake and alert, not in distress. 

Neck:  Supple.  No elevated JVD. 

Heart:  Regular rate and rhythm.  Normal S1, S2. 

Chest:  Clear to auscultation bilaterally.  No rales or wheezes. 

Abdomen:  Soft, nontender.  Has a Rosas catheter with hematuria. 

Extremities:  No edema.



Medications:  Include Coreg, finasteride, sodium chloride, oxybutynin, and Flomax.



Laboratory Data:  Sodium 141, potassium 3.8, BUN 46, creatinine 4.3.  White count of 11, hemoglobin 1
2.4.



Assessment And Plan:  

1.Acute kidney injury, likely due to obstructive uropathy.  Creatinine, no available labs since Augu
st 2019.  Creatinine was 0.9 in 2019.  Continue IV fluid and Flomax and Rosas catheter.  Renally dose
 medication.  Avoid NSAID and contrast.

2.Obstructive uropathy with bilateral hydronephrosis and enlarged prostate.  Continue Rosas and birdie
steride.  Urology evaluation.  We will continue stool softener.

3.Hematuria, likely traumatic.  Urine is clearing up.  Monitor hemoglobin and hematocrit.

4.Hypertension.  Blood pressure is controlled.  Continue on Coreg. 



Thanks for allowing me to participate in patient's care.  Total time spent 25 minutes including docum
entation, reviewing labs, and placing order.





LEIGH

DD:  08/09/2024 18:03:11Voice ID:  [id number]

DT:  08/09/2024 22:13:07Report ID:  [id number]

## 2024-08-10 LAB
ALBUMIN SERPL BCP-MCNC: 3.2 G/DL (ref 3.4–5)
ANION GAP SERPL CALC-SCNC: 10.9 MEQ/L (ref 5–15)
BUN BLD-MCNC: 44 MG/DL (ref 7–18)
GLUCOSE SERPLBLD-MCNC: 113 MG/DL (ref 74–106)
HCT VFR BLD CALC: 41.3 % (ref 39.6–49)
HGB BLD-MCNC: 13.6 G/DL (ref 13.6–17.9)
LYMPHOCYTES # SPEC AUTO: 0.8 K/UL (ref 0.7–4.9)
MAGNESIUM SERPL-MCNC: 1.7 MG/DL (ref 1.6–2.4)
MCH RBC QN AUTO: 30.1 PG (ref 27–35)
MCHC RBC AUTO-ENTMCNC: 32.8 G/DL (ref 32–36)
MCV RBC: 91.5 FL (ref 80–100)
NRBC # BLD: 0 10*3/UL (ref 0–0)
NRBC BLD AUTO-RTO: 0 % (ref 0–0)
PMV BLD: 8.8 FL (ref 7.6–11.3)
POTASSIUM SERPL-SCNC: 3.9 MEQ/L (ref 3.5–5.1)
RBC # BLD: 4.51 M/UL (ref 4.33–5.43)
WBC # BLD AUTO: 10 THOU/UL (ref 4.3–10.9)

## 2024-08-10 RX ADMIN — MAGNESIUM SULFATE IN DEXTROSE ONE MLS: 10 INJECTION, SOLUTION INTRAVENOUS at 07:04

## 2024-08-10 RX ADMIN — POTASSIUM CHLORIDE ONE MEQ: 10 TABLET, FILM COATED, EXTENDED RELEASE ORAL at 08:08

## 2024-08-10 NOTE — P.PN
Subjective


Date of Service: 08/10/24


Chief Complaint: JOVITA


Patient reports spasms in his thigh muscles, no other complaint.


He denies bladder spasm.


Urine is more clear from blood today.














Physical Examination





- Vital Signs


Temperature: 98.4 F


Blood Pressure: 143/83


Pulse: 90


Respirations: 16


Pulse Ox (%): 98





Assessment And Plan





- Plan


Physical examination


General: Alert and oriented x3, NAD, 


Neck: Supple, no elevated JVD


Heart: Heart sounds 1 and 2 normal, regular rhythm, normal rate, no pedal edema


Lungs: Clear to auscultation bilaterally, adequate breath sounds bilaterally, no

rhonchi or crackles.


Abdomen: Soft, nondistended, nontender, normal bowel sounds.


Genitourinary: Rosas catheter in place


Extremities: No tenderness.


Skin: Normal skin turgor, no rash, no nodules or ulcers.


Neuro: No focal motor deficit.  Normal speech.


Psychiatry: Normal mood, no agitation.





Plan:


Bilateral hydronephrosis associated with distended bladder secondary to BPH


JOVITA secondary to obstruction from BPH


Hematuria


-Rosas catheter reinserted and draining.  Urine is more clear from blood.


-UA negative for infectious process


-Serum creatinine continue to trend down.


-Continue IV hydration


-Oral rehydration as tolerated.


-Bladder irrigation as needed for hematuria


-Oxybutynin for bladder spasms


-Nephrology is following


-Monitor renal function.


-Hold NSAIDs at this time


-Will need follow-up outpatient with Dr. Diop











Chronic bilateral knee pain s/p surgery


Arthritis


-Reports taking ibuprophen daily


-NSAID discontinued.








DVT prophylaxis: SCD, no anticoagulation given hematuria.


Advance directive: Full code.

## 2024-08-11 LAB
ALBUMIN SERPL BCP-MCNC: 2.6 G/DL (ref 3.4–5)
ANION GAP SERPL CALC-SCNC: 10.7 MEQ/L (ref 5–15)
BUN BLD-MCNC: 40 MG/DL (ref 7–18)
GLUCOSE SERPLBLD-MCNC: 107 MG/DL (ref 74–106)
HCT VFR BLD CALC: 36.9 % (ref 39.6–49)
HGB BLD-MCNC: 12.4 G/DL (ref 13.6–17.9)
LYMPHOCYTES # SPEC AUTO: 0.7 K/UL (ref 0.7–4.9)
MAGNESIUM SERPL-MCNC: 1.7 MG/DL (ref 1.6–2.4)
MCH RBC QN AUTO: 30.2 PG (ref 27–35)
MCHC RBC AUTO-ENTMCNC: 33.7 G/DL (ref 32–36)
MCV RBC: 89.7 FL (ref 80–100)
NRBC # BLD: 0 10*3/UL (ref 0–0)
NRBC BLD AUTO-RTO: 0 % (ref 0–0)
PMV BLD: 8.5 FL (ref 7.6–11.3)
POTASSIUM SERPL-SCNC: 3.7 MEQ/L (ref 3.5–5.1)
RBC # BLD: 4.11 M/UL (ref 4.33–5.43)
WBC # BLD AUTO: 11.4 THOU/UL (ref 4.3–10.9)

## 2024-08-11 RX ADMIN — POTASSIUM CHLORIDE ONE MEQ: 10 TABLET, FILM COATED, EXTENDED RELEASE ORAL at 09:00

## 2024-08-11 RX ADMIN — MAGNESIUM SULFATE IN DEXTROSE ONE MLS: 10 INJECTION, SOLUTION INTRAVENOUS at 09:00

## 2024-08-11 NOTE — P.PN
Subjective


Date of Service: 08/11/24


Chief Complaint: JOVITA


Patient reports no complaints today.


He denies bladder spasm.


Urine is clear, no blood today.














Physical Examination





- Vital Signs


Temperature: 99.3 F


Blood Pressure: 141/70


Pulse: 72


Respirations: 22


Pulse Ox (%): 96





Assessment And Plan





- Plan


Physical examination


General: Alert and oriented x3, NAD, 


Neck: No elevated JVD


Heart: Heart sounds 1 and 2 normal, regular rhythm, normal rate, no pedal edema


Lungs: Clear to auscultation bilaterally, adequate breath sounds bilaterally, no

rhonchi or crackles.


Abdomen: Soft, nondistended, nontender, normal bowel sounds.


Genitourinary: Rosas catheter in place


Extremities: No tenderness.


Skin: Normal skin turgor, no rash, no nodules or ulcers.


Neuro: No focal motor deficit.  Normal speech.


Psychiatry: Normal mood, no agitation.





Plan:


Bilateral hydronephrosis associated with distended bladder secondary to BPH


JOVITA secondary to obstruction from BPH


Hematuria


-Rosas catheter reinserted and draining.  Urine is more clear from blood.


-UA negative for infectious process


-Serum creatinine continue to improve.


-Continue IV hydration


-Oral rehydration as tolerated.


-Bladder irrigation as needed for hematuria


-Oxybutynin for bladder spasms


-Nephrology is following


-Monitor renal function.


-Hold NSAIDs at this time


-Follow-up outpatient with Dr. Diop.





Chronic bilateral knee pain s/p surgery


Arthritis


-Reports taking ibuprophen daily


-NSAID discontinued.








DVT prophylaxis: SCD, no anticoagulation given hematuria.


Advance directive: Full code.

## 2024-08-12 VITALS — SYSTOLIC BLOOD PRESSURE: 155 MMHG | DIASTOLIC BLOOD PRESSURE: 87 MMHG

## 2024-08-12 VITALS — TEMPERATURE: 98 F

## 2024-08-12 VITALS — OXYGEN SATURATION: 98 %

## 2024-08-12 LAB
ALBUMIN SERPL BCP-MCNC: 2.5 G/DL (ref 3.4–5)
ANION GAP SERPL CALC-SCNC: 13.7 MEQ/L (ref 5–15)
BUN BLD-MCNC: 35 MG/DL (ref 7–18)
GLUCOSE SERPLBLD-MCNC: 103 MG/DL (ref 74–106)
HCT VFR BLD CALC: 37.8 % (ref 39.6–49)
HGB BLD-MCNC: 12.6 G/DL (ref 13.6–17.9)
LYMPHOCYTES # SPEC AUTO: 0.8 K/UL (ref 0.7–4.9)
MAGNESIUM SERPL-MCNC: 1.9 MG/DL (ref 1.6–2.4)
MCH RBC QN AUTO: 29.9 PG (ref 27–35)
MCHC RBC AUTO-ENTMCNC: 33.4 G/DL (ref 32–36)
MCV RBC: 89.5 FL (ref 80–100)
NRBC # BLD: 0 10*3/UL (ref 0–0)
NRBC BLD AUTO-RTO: 0.1 % (ref 0–0)
PMV BLD: 8.8 FL (ref 7.6–11.3)
POTASSIUM SERPL-SCNC: 3.7 MEQ/L (ref 3.5–5.1)
RBC # BLD: 4.22 M/UL (ref 4.33–5.43)
WBC # BLD AUTO: 10.1 THOU/UL (ref 4.3–10.9)

## 2024-08-12 RX ADMIN — POTASSIUM CHLORIDE ONE MEQ: 10 TABLET, FILM COATED, EXTENDED RELEASE ORAL at 08:39

## 2024-08-12 NOTE — P.DS
Admission Date: 08/07/24


Discharge Date: 08/12/24


Disposition: ROUTINE DISCHARGE


Discharge Condition: FAIR


Reason for Admission: JOVTIA


Brief History of Present Illness: 


Maxwell Barnes is a 74 year old male with history of left Femoral bypass, 

bilateral knee surgeries, who presented to the ED from his doctor's office where

labs were drawn showing he has acute kidney injury. He reports using ibuprofen 

for his chronic knee pain.  Patient denied any history of kidney disease.  He 

reported COVID infection 1 month ago. He reports wearing a pull up for 

incontinence. 





Rosas catheter placed in the ED, UOP 1600 on the floor. 





Laboratory evaluation BUN/Creatinine 43/5.17, GFR 11, UA negative for infectious

process.





CT abdomen reports "bilateral hydronephrosis and distended bladder with markedly

enlarged prostate concerning for acute urinary retention. "





Patient was admitted for further management.





Hospital Course: 


Patient admitted to the medical floor with the following medical problems 

addressed:





Bilateral hydronephrosis associated with distended bladder secondary to BPH


JOVITA secondary to obstruction from BPH


Hematuria


-Patient requested initial Rosas catheter to be removed due to hematuria and 

pain


-Rosas catheter was reinserted reinserted the following day, which initially 

drained bloodstained urine.  His urine later cleared up.


-UA negative for infectious process


-Serum creatinine improved with IV hydration and Rosas catheter in place.


-Creatinine is down to 2.27


-Patient did not require bladder irrigation.


-Oxybutynin for bladder spasms


-She was also on tamsulosin for BPH


-Nephrology evaluated patient and assisted with management.


-Follow-up outpatient with Dr. Diop.  Dr. Diop informed regarding patient's 

follow-up.


-Patient's symptoms have improved, renal function improved.  He is deemed stable

for discharge.





Chronic bilateral knee pain s/p surgery


Arthritis


-Reports taking ibuprophen daily


-NSAID discontinued.





Vital Signs/Physical Exam: 














Temp Pulse Resp BP Pulse Ox


 


 97.5 F   71   16   155/87 H  98 


 


 08/12/24 04:00  08/12/24 08:39  08/12/24 04:00  08/12/24 08:39  08/12/24 04:00








General: Alert, In no apparent distress, Oriented x3


HEENT: Mucous membr. moist/pink


Neck: Supple, JVD not distended


Respiratory: Clear to auscultation bilaterally, Normal air movement


Cardiovascular: No edema, Regular rate/rhythm, Normal S1 S2


Gastrointestinal: Normal bowel sounds, Soft and benign, Non-distended, No 

tenderness


Musculoskeletal: No swelling, No tenderness


Integumentary: No rashes, No cyanosis


Neurological: Normal speech, Normal strength at 5/5 x4 extr


Urinary: Rosas catheter


Laboratory Data at Discharge: 














WBC  10.10 thou/uL (4.3-10.9)   08/12/24  04:38    


 


Hgb  12.6 g/dL (13.6-17.9)  L  08/12/24  04:38    


 


Hct  37.8 % (39.6-49.0)  L  08/12/24  04:38    


 


Plt Count  305 thou/uL (152-406)   08/12/24  04:38    


 


Sodium  141 mEq/L (136-145)   08/12/24  04:38    


 


Potassium  3.7 mEq/L (3.5-5.1)   08/12/24  04:38    


 


BUN  35 mg/dL (7-18)  H  08/12/24  04:38    


 


Creatinine  2.29 mg/dL (0.70-1.30)  H  08/12/24  04:38    


 


Glucose  103 mg/dL ()   08/12/24  04:38    


 


Uric Acid  8.7 mg/dL (3.5-7.2)  H  08/09/24  05:25    


 


Phosphorus  3.1 mg/dL (2.5-4.9)   08/12/24  04:38    


 


Magnesium  1.9 mg/dL (1.6-2.4)   08/12/24  04:38    


 


Total Bilirubin  0.7 mg/dL (0.2-1.0)   08/07/24  12:42    


 


AST  15 U/L (15-37)   08/07/24  12:42    


 


ALT  22 U/L (16-61)   08/07/24  12:42    


 


Alkaline Phosphatase  78 U/L ()   08/07/24  12:42    








Home Medications: 








Tamsulosin [Flomax*] 1 cap PO DAILY 08/08/24 


Docusate [Colace Cap*] 100 mg PO BID #60 cap 08/12/24 


Finasteride [Proscar*] 5 mg PO DAILY #30 tab 08/12/24 


Hydrocodone 10/APAP 325 [Norco 10/325*] 1 tab PO Q4H PRN #15 tab 08/12/24 


carvediloL [Coreg*] 6.25 mg PO BID #60 tab 08/12/24 


oxyBUTYnin chloride [Ditropan*] 5 mg PO BEDTIME #30 tab 08/12/24 





New Medications: 


Docusate [Colace Cap*] 100 mg PO BID #60 cap


carvediloL [Coreg*] 6.25 mg PO BID #60 tab


oxyBUTYnin chloride [Ditropan*] 5 mg PO BEDTIME #30 tab


Hydrocodone 10/APAP 325 [Norco 10/325*] 1 tab PO Q4H PRN #15 tab


 PRN Reason: Pain Scale 8-10 (Severe)


Finasteride [Proscar*] 5 mg PO DAILY #30 tab


Diet: AHA


Activity: Ad yusef


Followup: 


Natalia Alaniz MD [ACTIVE - CAN ADMIT] - 1-2 Weeks


Kali Smith [ACTIVE - CAN ADMIT] - 1-2 Weeks


Skyla Benavides [Primary Care Provider] - 1-2 Weeks


Time spent managing pt's care (in minutes): 38

## 2024-08-12 NOTE — PN
Date of Progress Note:  08/12/2024



Chief Complaint:  Acute kidney injury.



Subjective:  Serum creatinine upon admission was up to 5.7.  Patient had the Rosas catheter was place
d for urinary retention and the patient was on IV fluids to treat acute kidney injury.  The patient d
eveloped obstructive uropathy, bladder outlet obstruction, and he has currently indwelling Rosas cath
eter.



Review of Systems:

Denies chest pain, palpitation.



Physical Examination:

Lungs:  Clear to auscultation bilaterally. 

Heart:  S1, S2. 

Abdomen:  Soft, benign.  Not tender. 

Extremities:  No edema.



Impression And Plan:  

1.Acute kidney injury due to obstructive uropathy, bladder outlet obstruction, and hypovolemia.  The
 patient will continue Rosas catheter and he will see urologist for evaluation.  Continue adequate hy
dration.  The patient was started on IV fluids for acute kidney injury and volemia currently is repla
charly.

2.Obstructive uropathy with bilateral hydronephrosis and enlarged prostate.  Continue Rosas catheter
 and continue finasteride.

3.Hematuria.  The patient will follow up with urologist for hematuria and bladder outlet obstruction
.

4.Hypertension.  Blood pressure control is controlled.  Continue Coreg and avoid ACE inhibitor and a
void angiotensin-receptor 

blocker due to risk of hyperkalemia in setting of acute kidney injury with bladder outlet obstruction
.





JOHANN/VIC

DD:  08/12/2024 12:33:23Voice ID:  [id number]

DT:  08/12/2024 18:06:09Report ID:  [id number]